# Patient Record
(demographics unavailable — no encounter records)

---

## 2025-07-02 NOTE — HISTORY OF PRESENT ILLNESS
[de-identified] : The patient is a 21 year  old RT hand dominant female who presents today complaining of RT Knee Pain.   Date of Injury/Onset: 6/29/25 Pain:    At Rest: 4/10  With Activity:  8/10  Mechanism of injury: while running went to step in for the ball and her opponent ran through her right leg and heard/felt a pop in her knee This is NOT a Work Related Injury being treated under Worker's Compensation. This IS an athletic injury occurring associated with an interscholastic or organized sports team. 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 Aguilera in [facility]. L Knee ACLR w/ Quad in 2023- Dr Aguilera

## 2025-07-02 NOTE — IMAGING
[Right] : right knee [de-identified] : The patient is a well appearing 21 year old female of their stated age. Patient ambulates with crutches. Negative straight leg raise bilateral   Effected Knee: RIGHT ROM:  1-90 degrees Lachman: POSITIVE Pivot Shift: POSITIVE Anterior Drawer: POSITIVE Posterior Drawer / Sag: Negative Varus Stress 0 degrees: Stable Varus Stress 30 degrees: Stable Valgus Stress 0 degrees: Stable Valgus Stress 30 degrees: Stable Medial Angus: Negative Lateral Angus: Negative Patella Glide: 2+ Patella Apprehension: Negative Patella Grind: Negative   Palpation: Medial Joint Line: Nontender Lateral Joint Line: TENDER Medial Collateral Ligament: Nontender Lateral Collateral Ligament/PLC: Nontender Distal Femur: Nontender Proximal Tibia: Nontender Tibial Tubercle: Nontender Distal Pole Patella: Nontender Quadriceps Tendon: Nontender &  Intact Patella Tendon: Nontender &  Intact Medial Femoral Condyle: Nontender Medial Distal Hamstring/PES: Nontender Lateral Distal Hamstring: Nontender & Stable Iliotibial Band: Nontender Medial Patellofemoral Ligament: Nontender Adductor: Nontender Proximal GSC-Plantaris: Nontender Calf: Supple & Nontender   Inspection: Deformity: No Erythema: No Ecchymosis: No Abrasions: No Effusion: MODERATE Prepatella Bursitis: No Neurologic Exam: Sensation L4-S1: Grossly Intact Motor Exam: Quadriceps: 4 out of 5 Hamstrings: 4 out of 5 EHL: 5 out of 5 FHL: 5 out of 5 TA: 5 out of 5 GS: 5 out of 5 Circulatory/Pulses: Dorsalis Pedis: 2+ Posterior Tibialis: 2+ Additional Pertinent Findings: None     Contralateral Knee:                       ROM: 0-145 degrees Other Pertinent Findings: None   Assessment: The patient is a 21 year old female with Right knee pain and radiographic and physical exam findings consistent with possible retear of the ACL. The patients condition is acute Documents/Results Reviewed Today: X-Ray right knee STAT MRI right knee Tests/Studies Independently Interpreted Today: X-Ray right knee revealed evidence of previous ACL reconstruction, button on femur.  STAT MRI right knee reveals evidence of complete retear of ACL and evidence of prior ACL reconstruction with quadriceps tendon graft Pertinent findings include: 1-90 degrees, moderate effusion, +LM/PS/AD, +LJLT, pain with hyperextension, 4/5 quad and hamstring strength Confounding medical conditions/concerns: R Knee ACLR w/ Quad tendon in 2021- Dr. Aguilera in [facility]. L Knee ACLR w/ Quad in 2023- Dr Aguilera     Plan: ******The patient presented with an acute Right knee injury sustained while playing soccer The mechanism of injury was consistent with a possible retear ACL tear. Physical examination revealed a guarded exam with equivocal ACL testing and significant effusion. Given the concerning mechanism, acute presentation, significant effusion, and equivocal exam findings concerning for a possible surgical retear ACL tear, a STAT MRI was indicated to rule out native surgical pathology and to guide definitive treatment. This was especially important given the patient's athletic status and the potential need for timely surgical intervention if a retear ACL tear was confirmed. ************   Upon review of radiographic imaging, discussed treatment options for the patient's retear of her ACL. I have requested the patient obtain an CT scan right knee to evaluate the tunnel positioning and widening from her previous ACL surgery to better plan for surgical planning.  Once we are able to review CT scan of her knee we can plan surgical intervention. She is shut down from gym/sports. The patient was prescribed a XROM knee brace today to prevent further injury to the knee- prescription given today.    Tests Ordered: STAT MRI right knee ordered and reviewed today CT scan Right knee Prescription Medications Ordered: Discussed appropriate use of OTC anti-inflammatories and analgesics (including but not limited to Aleve, Advil, Tylenol, Motrin, Ibuprofen, Voltaren gel, etc.) Braces/DME Ordered: None Activity/Work/Sports Status: shut down from gym/sports Additional Instructions: None Follow-Up: after CT scan   Letter of Medical Necessity for Knee XROM brace   The patient is prescribed a knee XROM brace today for post operative function. This brace is indicated to protect the surgically repaired knee due to instability during the continued ligamentous healing process, and while the patient increases their activities of daily living and prospective return to gym and sports.  The patient's current medication management of their orthopedic diagnosis was reviewed today: The patient declined and/or was contraindicated for the recommended prescription medication Naprosyn and will use over the counter Advil, Aleve, Voltaren Gel or Tylenol as directed. (1) We discussed a comprehensive treatment plan that included possible pharmaceutical management involving the use of prescription strength medications including but not limited to options such as oral Naprosyn 500mg BID, once daily Meloxicam 15 mg, or 500-650 mg Tylenol versus over the counter oral medications and topical prescription NSAID Pennsaid vs over the counter Voltaren gel.  Based on our extensive discussion, the patient declined prescription medication and will use over the counter Advil, Alleve, Voltaren Gel or Tylenol as directed. (2) There is a moderate risk of morbidity with further treatment, especially from use of prescription strength medications and possible side effects of these medications which include upset stomach with oral medications, skin reactions to topical medications and cardiac/renal issues with long term use. (3) I recommended that the patient follow-up with their medical physician to discuss any significant specific potential issues with long term medication use such as interactions with current medications or with exacerbation of underlying medical comorbidities. (4) The benefits and risks associated with use of injectable, oral or topical, prescription and over the counter anti-inflammatory medications were discussed with the patient. The patient voiced understanding of the risks including but not limited to bleeding, stroke, kidney dysfunction, heart disease, and were referred to the black box warning label for further information.  Micaela MENDOZA attest that this documentation has been prepared under the direction and in the presence of Provider Dr. Suhail Aguirre.  The documentation recorded by the scribe accurately reflects the services Dr. Suhail MENDOZA, personally performed and the decisions made by me.    [FreeTextEntry9] : X-Ray right knee revealed evidence of previous ACL reconstruction, button on femur.

## 2025-07-15 NOTE — DATA REVIEWED
[CT Scan] : CT scan [Right] : of the right [Knee] : knee [Report was reviewed and noted in the chart] : The report was reviewed and noted in the chart [I independently reviewed and interpreted images and report] : I independently reviewed and interpreted images and report [I reviewed the films/CD and additionally noted] : I reviewed the films/CD and additionally noted [FreeTextEntry1] : CT scan right knee reveals evidence of 9 mm femoral tunnel which is wide and anterior, tibial tunnel is narrow and posterior

## 2025-07-15 NOTE — IMAGING
[de-identified] : The patient is a well appearing 21 year old female of their stated age. Patient ambulates with crutches. Negative straight leg raise bilateral   Effected Knee: RIGHT ROM:  1-90 degrees Lachman: POSITIVE Pivot Shift: POSITIVE Anterior Drawer: POSITIVE Posterior Drawer / Sag: Negative Varus Stress 0 degrees: Stable Varus Stress 30 degrees: Stable Valgus Stress 0 degrees: Stable Valgus Stress 30 degrees: Stable Medial Angus: Negative Lateral Angus: Negative Patella Glide: 2+ Patella Apprehension: Negative Patella Grind: Negative   Palpation: Medial Joint Line: Nontender Lateral Joint Line: TENDER Medial Collateral Ligament: Nontender Lateral Collateral Ligament/PLC: Nontender Distal Femur: Nontender Proximal Tibia: Nontender Tibial Tubercle: Nontender Distal Pole Patella: Nontender Quadriceps Tendon: Nontender &  Intact Patella Tendon: Nontender &  Intact Medial Femoral Condyle: Nontender Medial Distal Hamstring/PES: Nontender Lateral Distal Hamstring: Nontender & Stable Iliotibial Band: Nontender Medial Patellofemoral Ligament: Nontender Adductor: Nontender Proximal GSC-Plantaris: Nontender Calf: Supple & Nontender   Inspection: Deformity: No Erythema: No Ecchymosis: No Abrasions: No Effusion: MODERATE Prepatella Bursitis: No Neurologic Exam: Sensation L4-S1: Grossly Intact Motor Exam: Quadriceps: 4 out of 5 Hamstrings: 4 out of 5 EHL: 5 out of 5 FHL: 5 out of 5 TA: 5 out of 5 GS: 5 out of 5 Circulatory/Pulses: Dorsalis Pedis: 2+ Posterior Tibialis: 2+ Additional Pertinent Findings: None     Contralateral Knee:                       ROM: 0-145 degrees Other Pertinent Findings: None   Assessment: The patient is a 21 year old female with Right knee pain and radiographic and physical exam findings consistent with ACL re tear and tunnel malposition The patients condition is acute Documents/Results Reviewed Today: CT scan right knee Tests/Studies Independently Interpreted Today: CT scan right knee reveals evidence of 9 mm femoral tunnel which is wide and anterior, tibial tunnel is narrow and posterior  Pertinent findings include: 1-90 degrees, moderate effusion, +LM/PS/AD, +LJLT, pain with hyperextension, 4/5 quad and hamstring strength Confounding medical conditions/concerns: R Knee ACLR w/ Quad tendon in 2021- Dr. Aguilera in [facility]. L Knee ACLR w/ Quad in 2023- Dr Aguilera     Plan: Upon review of CT scan, discussed with patient that her femoral tunnel is wide and anterior and her tibial tunnel is narrow and posterior.  Due to the potential overlap with the femoral and tibial tunnels, we will need to stage a bone graft and then do her ACL reconstruction. Discussed operative vs non-operative treatment options including right knee arthroscopic debridement, bone grafting of tibia and femur with bone marrow aspirate and allograft bone and any indicated procedures. Recommended the patient proceed with the suggested surgical procedure to improve the functionality of the affected joint. All questions and concerns regarding the surgery were addressed. Went over the recovery timeline and expected outcomes following surgery. The patient continues to be symptomatic and has failed conservative treatment, deciding to move forward with surgical intervention. Tests Ordered: pre-op Prescription Medications Ordered: Discussed appropriate use of OTC anti-inflammatories and analgesics (including but not limited to Aleve, Advil, Tylenol, Motrin, Ibuprofen, Voltaren gel, etc.) Braces/DME Ordered: None Activity/Work/Sports Status: shut down from gym/sports Additional Instructions: None Follow-Up: 2 weeks post op    The patient's current medication management of their orthopedic diagnosis was reviewed today: The patient declined and/or was contraindicated for the recommended prescription medication Naprosyn and will use over the counter Advil, Aleve, Voltaren Gel or Tylenol as directed. (1) We discussed a comprehensive treatment plan that included possible pharmaceutical management involving the use of prescription strength medications including but not limited to options such as oral Naprosyn 500mg BID, once daily Meloxicam 15 mg, or 500-650 mg Tylenol versus over the counter oral medications and topical prescription NSAID Pennsaid vs over the counter Voltaren gel.  Based on our extensive discussion, the patient declined prescription medication and will use over the counter Advil, Alleve, Voltaren Gel or Tylenol as directed. (2) There is a moderate risk of morbidity with further treatment, especially from use of prescription strength medications and possible side effects of these medications which include upset stomach with oral medications, skin reactions to topical medications and cardiac/renal issues with long term use. (3) I recommended that the patient follow-up with their medical physician to discuss any significant specific potential issues with long term medication use such as interactions with current medications or with exacerbation of underlying medical comorbidities. (4) The benefits and risks associated with use of injectable, oral or topical, prescription and over the counter anti-inflammatory medications were discussed with the patient. The patient voiced understanding of the risks including but not limited to bleeding, stroke, kidney dysfunction, heart disease, and were referred to the black box warning label for further information.  Consent:  Conservative treatment, nontreatment, nonsurgical intervention and surgical intervention treatment options have been reviewed with the patient.  The patient continues to be symptomatic and has failed conservative treatment, and elects to move forward with surgical intervention.  The patient is indicated for right knee arthroscopic debridement, bone grafting of tibia and femur with bone marrow aspirate and allograft bone and all indicated procedures. As such the alternatives, benefits and risks, of the above procedure, including but not limited to bleeding, infection, neurovascular injury, loss of limb, loss of life,  DVT, PE, RSD, inability to return to previous level of activity, inability to return to previous level of employment, advancement of or to osteoarthritic changes, joint instability or motion loss, hardware failure or migration, failure to resolve all symptoms, failure to return to sports and need for further procedures, as well as specific risk of meniscus repair failure, anterior knee pain and patella fracture, and need for future joint arthroplasty were discussed with the patient and/or their legal guardian who agreed to move forward with surgical intervention.  They have reviewed and signed the consent form today after expressing understanding of the above documented conversation. The patient or their representative will contact my office as instructed on the preoperative instruction sheet they received today to schedule surgery in a timely manner as discussed.  Over 25 minutes were spent on this encounter including time with the patient and over 15 minutes spent on counseling and coordination of care.   Micaela MENDOZA attest that this documentation has been prepared under the direction and in the presence of Provider Dr. Suhail Aguirre.  The documentation recorded by the scribe accurately reflects the services IDr. Suhail, personally performed and the decisions made by me.

## 2025-07-15 NOTE — HISTORY OF PRESENT ILLNESS
[de-identified] : The patient is a 22 year  old RT hand dominant female who presents today complaining of RT Knee Pain.   Date of Injury/Onset: 6/29/25 Pain:    At Rest: 0/10  With Activity:  3/10  Mechanism of injury: while running went to step in for the ball and her opponent ran through her right leg and heard/felt a pop in her knee This is NOT a Work Related Injury being treated under Worker's Compensation. This IS an athletic injury occurring associated with an interscholastic or organized sports team. 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 Feeling a lot better. Just c/o pain after pronged standing. 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 Obtained knee xrom, wearing it while standing for long periods of time.  Additional Information: R Knee ACLR w/ Quad tendon in 2021- Dr. Aguilera in [facility]. L Knee ACLR w/ Quad in 2023- Dr Aguilera

## 2025-07-15 NOTE — IMAGING
[de-identified] : The patient is a well appearing 21 year old female of their stated age. Patient ambulates with crutches. Negative straight leg raise bilateral   Effected Knee: RIGHT ROM:  1-90 degrees Lachman: POSITIVE Pivot Shift: POSITIVE Anterior Drawer: POSITIVE Posterior Drawer / Sag: Negative Varus Stress 0 degrees: Stable Varus Stress 30 degrees: Stable Valgus Stress 0 degrees: Stable Valgus Stress 30 degrees: Stable Medial Angus: Negative Lateral Angus: Negative Patella Glide: 2+ Patella Apprehension: Negative Patella Grind: Negative   Palpation: Medial Joint Line: Nontender Lateral Joint Line: TENDER Medial Collateral Ligament: Nontender Lateral Collateral Ligament/PLC: Nontender Distal Femur: Nontender Proximal Tibia: Nontender Tibial Tubercle: Nontender Distal Pole Patella: Nontender Quadriceps Tendon: Nontender &  Intact Patella Tendon: Nontender &  Intact Medial Femoral Condyle: Nontender Medial Distal Hamstring/PES: Nontender Lateral Distal Hamstring: Nontender & Stable Iliotibial Band: Nontender Medial Patellofemoral Ligament: Nontender Adductor: Nontender Proximal GSC-Plantaris: Nontender Calf: Supple & Nontender   Inspection: Deformity: No Erythema: No Ecchymosis: No Abrasions: No Effusion: MODERATE Prepatella Bursitis: No Neurologic Exam: Sensation L4-S1: Grossly Intact Motor Exam: Quadriceps: 4 out of 5 Hamstrings: 4 out of 5 EHL: 5 out of 5 FHL: 5 out of 5 TA: 5 out of 5 GS: 5 out of 5 Circulatory/Pulses: Dorsalis Pedis: 2+ Posterior Tibialis: 2+ Additional Pertinent Findings: None     Contralateral Knee:                       ROM: 0-145 degrees Other Pertinent Findings: None   Assessment: The patient is a 21 year old female with Right knee pain and radiographic and physical exam findings consistent with ACL re tear and tunnel malposition The patients condition is acute Documents/Results Reviewed Today: CT scan right knee Tests/Studies Independently Interpreted Today: CT scan right knee reveals evidence of 9 mm femoral tunnel which is wide and anterior, tibial tunnel is narrow and posterior  Pertinent findings include: 1-90 degrees, moderate effusion, +LM/PS/AD, +LJLT, pain with hyperextension, 4/5 quad and hamstring strength Confounding medical conditions/concerns: R Knee ACLR w/ Quad tendon in 2021- Dr. Aguilera in [facility]. L Knee ACLR w/ Quad in 2023- Dr Aguilera     Plan: Upon review of CT scan, discussed with patient that her femoral tunnel is wide and anterior and her tibial tunnel is narrow and posterior.  Due to the potential overlap with the femoral and tibial tunnels, we will need to stage a bone graft and then do her ACL reconstruction. Discussed operative vs non-operative treatment options including right knee arthroscopic debridement, bone grafting of tibia and femur with bone marrow aspirate and allograft bone and any indicated procedures. Recommended the patient proceed with the suggested surgical procedure to improve the functionality of the affected joint. All questions and concerns regarding the surgery were addressed. Went over the recovery timeline and expected outcomes following surgery. The patient continues to be symptomatic and has failed conservative treatment, deciding to move forward with surgical intervention. Tests Ordered: pre-op Prescription Medications Ordered: Discussed appropriate use of OTC anti-inflammatories and analgesics (including but not limited to Aleve, Advil, Tylenol, Motrin, Ibuprofen, Voltaren gel, etc.) Braces/DME Ordered: None Activity/Work/Sports Status: shut down from gym/sports Additional Instructions: None Follow-Up: 2 weeks post op    The patient's current medication management of their orthopedic diagnosis was reviewed today: The patient declined and/or was contraindicated for the recommended prescription medication Naprosyn and will use over the counter Advil, Aleve, Voltaren Gel or Tylenol as directed. (1) We discussed a comprehensive treatment plan that included possible pharmaceutical management involving the use of prescription strength medications including but not limited to options such as oral Naprosyn 500mg BID, once daily Meloxicam 15 mg, or 500-650 mg Tylenol versus over the counter oral medications and topical prescription NSAID Pennsaid vs over the counter Voltaren gel.  Based on our extensive discussion, the patient declined prescription medication and will use over the counter Advil, Alleve, Voltaren Gel or Tylenol as directed. (2) There is a moderate risk of morbidity with further treatment, especially from use of prescription strength medications and possible side effects of these medications which include upset stomach with oral medications, skin reactions to topical medications and cardiac/renal issues with long term use. (3) I recommended that the patient follow-up with their medical physician to discuss any significant specific potential issues with long term medication use such as interactions with current medications or with exacerbation of underlying medical comorbidities. (4) The benefits and risks associated with use of injectable, oral or topical, prescription and over the counter anti-inflammatory medications were discussed with the patient. The patient voiced understanding of the risks including but not limited to bleeding, stroke, kidney dysfunction, heart disease, and were referred to the black box warning label for further information.  Consent:  Conservative treatment, nontreatment, nonsurgical intervention and surgical intervention treatment options have been reviewed with the patient.  The patient continues to be symptomatic and has failed conservative treatment, and elects to move forward with surgical intervention.  The patient is indicated for right knee arthroscopic debridement, bone grafting of tibia and femur with bone marrow aspirate and allograft bone and all indicated procedures. As such the alternatives, benefits and risks, of the above procedure, including but not limited to bleeding, infection, neurovascular injury, loss of limb, loss of life,  DVT, PE, RSD, inability to return to previous level of activity, inability to return to previous level of employment, advancement of or to osteoarthritic changes, joint instability or motion loss, hardware failure or migration, failure to resolve all symptoms, failure to return to sports and need for further procedures, as well as specific risk of meniscus repair failure, anterior knee pain and patella fracture, and need for future joint arthroplasty were discussed with the patient and/or their legal guardian who agreed to move forward with surgical intervention.  They have reviewed and signed the consent form today after expressing understanding of the above documented conversation. The patient or their representative will contact my office as instructed on the preoperative instruction sheet they received today to schedule surgery in a timely manner as discussed.  Over 25 minutes were spent on this encounter including time with the patient and over 15 minutes spent on counseling and coordination of care.   Micaela MENDOZA attest that this documentation has been prepared under the direction and in the presence of Provider Dr. Suhail Aguirre.  The documentation recorded by the scribe accurately reflects the services IDr. Suhail, personally performed and the decisions made by me.

## 2025-07-15 NOTE — HISTORY OF PRESENT ILLNESS
[de-identified] : The patient is a 22 year  old RT hand dominant female who presents today complaining of RT Knee Pain.   Date of Injury/Onset: 6/29/25 Pain:    At Rest: 0/10  With Activity:  3/10  Mechanism of injury: while running went to step in for the ball and her opponent ran through her right leg and heard/felt a pop in her knee This is NOT a Work Related Injury being treated under Worker's Compensation. This IS an athletic injury occurring associated with an interscholastic or organized sports team. 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 Feeling a lot better. Just c/o pain after pronged standing. 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 Obtained knee xrom, wearing it while standing for long periods of time.  Additional Information: R Knee ACLR w/ Quad tendon in 2021- Dr. Aguilera in [facility]. L Knee ACLR w/ Quad in 2023- Dr Aguilera